# Patient Record
Sex: FEMALE | Race: WHITE | NOT HISPANIC OR LATINO | Employment: PART TIME | URBAN - METROPOLITAN AREA
[De-identification: names, ages, dates, MRNs, and addresses within clinical notes are randomized per-mention and may not be internally consistent; named-entity substitution may affect disease eponyms.]

---
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
## 2023-02-07 ENCOUNTER — OFFICE VISIT (OUTPATIENT)
Dept: FAMILY MEDICINE CLINIC | Facility: CLINIC | Age: 18
End: 2023-02-07
Payer: COMMERCIAL

## 2023-02-07 VITALS
OXYGEN SATURATION: 99 % | DIASTOLIC BLOOD PRESSURE: 85 MMHG | SYSTOLIC BLOOD PRESSURE: 138 MMHG | HEART RATE: 85 BPM | WEIGHT: 197.8 LBS | HEIGHT: 66 IN | TEMPERATURE: 97.7 F | BODY MASS INDEX: 31.79 KG/M2 | RESPIRATION RATE: 16 BRPM

## 2023-02-07 DIAGNOSIS — F41.8 MIXED ANXIETY DEPRESSIVE DISORDER: Primary | ICD-10-CM

## 2023-02-07 PROCEDURE — 99213 OFFICE O/P EST LOW 20 MIN: CPT | Performed by: FAMILY MEDICINE

## 2023-02-07 RX ORDER — VENLAFAXINE HYDROCHLORIDE 150 MG/1
150 CAPSULE, EXTENDED RELEASE ORAL DAILY
Qty: 30 CAPSULE | Refills: 3 | Status: SHIPPED | OUTPATIENT
Start: 2023-02-07 | End: 2023-04-04

## 2023-02-07 NOTE — PROGRESS NOTES
Subjective   Luz Elena Hernandez is a 17 y.o. female.     History of Present Illness     Presents for 1 month follow-up visit on anxiety. Patient reports improvement in symptoms. She denies  depressed mood, dizziness, nausea or nervous/anxious behavior. The severity of symptoms is mild.  She is taking Effexor which is helping.    The following portions of the patient's history were reviewed and updated as appropriate: past medical history, past social history, past surgical history and problem list.    Review of Systems   Psychiatric/Behavioral: Negative for sleep disturbance and depressed mood. The patient is not nervous/anxious.        Objective   Physical Exam  Vitals reviewed.   Neurological:      Mental Status: She is alert and oriented to person, place, and time.   Psychiatric:         Mood and Affect: Mood normal.       Vitals:    02/07/23 1319   BP: 138/85   Pulse: 85   Resp: 16   Temp: 97.7 °F (36.5 °C)   SpO2: 99%     No current outpatient medications on file prior to visit.     No current facility-administered medications on file prior to visit.         Assessment & Plan   Problems Addressed this Visit        Mental Health    Mixed anxiety depressive disorder - Primary     Anxiety is improving. The patient verified not suicidal at this time, she will call our office if there is  any worsening of Sx or thoughts of doing harm arise.             Relevant Medications    venlafaxine XR (Effexor XR) 150 MG 24 hr capsule   Diagnoses       Codes Comments    Mixed anxiety depressive disorder    -  Primary ICD-10-CM: F41.8  ICD-9-CM: 300.4

## 2023-02-11 NOTE — ASSESSMENT & PLAN NOTE
Anxiety is improving. The patient verified not suicidal at this time, she will call our office if there is  any worsening of Sx or thoughts of doing harm arise.

## 2023-04-04 RX ORDER — VENLAFAXINE HYDROCHLORIDE 150 MG/1
CAPSULE, EXTENDED RELEASE ORAL
Qty: 30 CAPSULE | Refills: 3 | Status: SHIPPED | OUTPATIENT
Start: 2023-04-04

## 2023-10-23 ENCOUNTER — OFFICE VISIT (OUTPATIENT)
Dept: FAMILY MEDICINE CLINIC | Facility: CLINIC | Age: 18
End: 2023-10-23
Payer: COMMERCIAL

## 2023-10-23 VITALS
TEMPERATURE: 97.7 F | HEIGHT: 66 IN | DIASTOLIC BLOOD PRESSURE: 56 MMHG | HEART RATE: 81 BPM | BODY MASS INDEX: 30.89 KG/M2 | OXYGEN SATURATION: 98 % | SYSTOLIC BLOOD PRESSURE: 113 MMHG | WEIGHT: 192.2 LBS | RESPIRATION RATE: 16 BRPM

## 2023-10-23 DIAGNOSIS — F41.8 MIXED ANXIETY DEPRESSIVE DISORDER: Primary | ICD-10-CM

## 2023-10-23 PROCEDURE — 99213 OFFICE O/P EST LOW 20 MIN: CPT | Performed by: FAMILY MEDICINE

## 2023-10-23 RX ORDER — VENLAFAXINE HYDROCHLORIDE 150 MG/1
150 CAPSULE, EXTENDED RELEASE ORAL DAILY
Qty: 30 CAPSULE | Refills: 3 | Status: SHIPPED | OUTPATIENT
Start: 2023-10-23

## 2023-10-23 RX ORDER — VENLAFAXINE HYDROCHLORIDE 150 MG/1
150 CAPSULE, EXTENDED RELEASE ORAL DAILY
Qty: 30 CAPSULE | Refills: 3 | Status: SHIPPED | OUTPATIENT
Start: 2023-10-23 | End: 2023-10-23 | Stop reason: SDUPTHER

## 2023-10-23 RX ORDER — LAMOTRIGINE 200 MG/1
50 TABLET ORAL
COMMUNITY
Start: 2023-10-21

## 2024-02-10 RX ORDER — VENLAFAXINE HYDROCHLORIDE 150 MG/1
150 CAPSULE, EXTENDED RELEASE ORAL DAILY
Qty: 90 CAPSULE | Refills: 2 | Status: SHIPPED | OUTPATIENT
Start: 2024-02-10

## 2024-11-04 NOTE — PROGRESS NOTES
Subjective   Luz Elena Hernandez is a 18 y.o. female.     History of Present Illness     Presents for follow-up visit. Patient reports improvement in symptoms. She is C/O anxiety but denies  depressed mood, dizziness, nausea or palpitation. The severity of symptoms is mild. The quality of sleep is fair.  She is taking medication as prescribed.    The following portions of the patient's history were reviewed and updated as appropriate: past medical history, past social history, past surgical history and problem list.    Review of Systems   Constitutional:  Negative for activity change and appetite change.   Cardiovascular:  Negative for palpitations.   Psychiatric/Behavioral:  Positive for stress. Negative for suicidal ideas and depressed mood. The patient is nervous/anxious.        Objective   Physical Exam  Vitals reviewed.   Neurological:      Mental Status: She is alert and oriented to person, place, and time.   Psychiatric:         Mood and Affect: Mood normal.       Vitals:    10/23/23 0919   BP: 113/56   Pulse: 81   Resp: 16   Temp: 97.7 °F (36.5 °C)   SpO2: 98%     Current Outpatient Medications on File Prior to Visit   Medication Sig Dispense Refill    lamoTRIgine (LaMICtal) 200 MG tablet 50 mg.      [DISCONTINUED] venlafaxine XR (EFFEXOR-XR) 150 MG 24 hr capsule TAKE ONE CAPSULE BY MOUTH DAILY 30 capsule 3    [DISCONTINUED] venlafaxine XR (EFFEXOR-XR) 150 MG 24 hr capsule TAKE 1 CAPSULE BY MOUTH EVERY DAY 30 capsule 3    [DISCONTINUED] venlafaxine XR (EFFEXOR-XR) 75 MG 24 hr capsule TAKE 1 CAPSULE BY MOUTH EVERY DAY 30 capsule 1     No current facility-administered medications on file prior to visit.           Assessment & Plan   Problems Addressed this Visit       Mixed anxiety depressive disorder - Primary     Patient's depression is recurrent and is mild without psychosis. Their depression is currently active and the condition is improving with treatment. This will be reassessed in 3 months. F/U as  described:patient will continue current medication therapy.         Relevant Medications    venlafaxine XR (EFFEXOR-XR) 150 MG 24 hr capsule     Diagnoses         Codes Comments    Mixed anxiety depressive disorder    -  Primary ICD-10-CM: F41.8  ICD-9-CM: 300.4                     [id number]

## 2024-12-12 ENCOUNTER — OFFICE VISIT (OUTPATIENT)
Dept: FAMILY MEDICINE CLINIC | Facility: CLINIC | Age: 19
End: 2024-12-12
Payer: COMMERCIAL

## 2024-12-12 VITALS
HEART RATE: 77 BPM | HEIGHT: 66 IN | WEIGHT: 167.4 LBS | TEMPERATURE: 97.7 F | RESPIRATION RATE: 16 BRPM | BODY MASS INDEX: 26.9 KG/M2 | SYSTOLIC BLOOD PRESSURE: 125 MMHG | OXYGEN SATURATION: 98 % | DIASTOLIC BLOOD PRESSURE: 70 MMHG

## 2024-12-12 DIAGNOSIS — B00.1 RECURRENT COLD SORES: Primary | ICD-10-CM

## 2024-12-12 PROCEDURE — 99213 OFFICE O/P EST LOW 20 MIN: CPT | Performed by: FAMILY MEDICINE

## 2024-12-12 RX ORDER — ACYCLOVIR 400 MG/1
400 TABLET ORAL 3 TIMES DAILY
Qty: 21 TABLET | Refills: 1 | Status: SHIPPED | OUTPATIENT
Start: 2024-12-12

## 2024-12-12 NOTE — PROGRESS NOTES
Subjective   Luz Elena Hernandez is a 19 y.o. female.     History of Present Illness     History of Present Illness  The patient is a 19-year-old female who presents with a complaint of cold sores. She has been experiencing recurrent cold sores for few years, with the most recent episode persisting longer than usual. The cold sores typically manifest on her lower lip, particularly during periods of stress, illness, menstruation, or in the winter season. She reports associated pain and occasional concurrent canker sores, although these are not severe. She has no history of genital herpes and reports no vaginal discharge or lesions. She has been using a generic over-the-counter medication similar to Abreva, which initially provided some relief.    The following portions of the patient's history were reviewed and updated as appropriate: past medical history, past social history, past surgical history and problem list.    Review of Systems   Constitutional:  Negative for fever.   Endocrine: Negative for polyphagia and polyuria.   Genitourinary:  Negative for dysuria, frequency, genital sores and vaginal discharge.   Skin:  Positive for skin lesions.        Cold Sore of lower lip       Results      Objective   Physical Exam  Vitals reviewed.   Pulmonary:      Effort: Pulmonary effort is normal.   Skin:     Comments: Cold sore of lower lip, no mouth lesion or sore   Neurological:      Mental Status: She is alert.         Vitals:    12/12/24 1508   BP: 125/70   Pulse: 77   Resp: 16   Temp: 97.7 °F (36.5 °C)   SpO2: 98%     Current Outpatient Medications on File Prior to Visit   Medication Sig Dispense Refill    lamoTRIgine (LaMICtal) 200 MG tablet 50 mg.      venlafaxine XR (EFFEXOR-XR) 150 MG 24 hr capsule Take 1 capsule by mouth Daily. 90 capsule 2     No current facility-administered medications on file prior to visit.         Assessment & Plan   Problems Addressed this Visit       Recurrent cold sores - Primary    Relevant  Medications    acyclovir (ZOVIRAX) 400 MG tablet     Diagnoses         Codes Comments    Recurrent cold sores    -  Primary ICD-10-CM: B00.1  ICD-9-CM: 054.9             Assessment & Plan  1. Cold sores.   She has been experiencing recurrent cold sores since childhood, primarily on the lower lip, exacerbated by stress, menstrual periods, and wintertime. Over-the-counter treatments like generic Abreva have been used but are no longer effective. A prescription for acyclovir 400 mg, to be taken as one tablet three times daily as needed for cold sore.           Patient or patient representative verbalized consent for the use of Ambient Listening during the visit with  Ana Bender MD for chart documentation. 12/12/2024  15:13 EST